# Patient Record
Sex: FEMALE | Race: WHITE | NOT HISPANIC OR LATINO | URBAN - METROPOLITAN AREA
[De-identification: names, ages, dates, MRNs, and addresses within clinical notes are randomized per-mention and may not be internally consistent; named-entity substitution may affect disease eponyms.]

---

## 2021-11-02 ENCOUNTER — EMERGENCY (EMERGENCY)
Facility: HOSPITAL | Age: 26
LOS: 1 days | Discharge: HOME | End: 2021-11-02
Admitting: EMERGENCY MEDICINE
Payer: COMMERCIAL

## 2021-11-02 VITALS
WEIGHT: 240.08 LBS | DIASTOLIC BLOOD PRESSURE: 90 MMHG | SYSTOLIC BLOOD PRESSURE: 125 MMHG | HEIGHT: 64 IN | RESPIRATION RATE: 18 BRPM | TEMPERATURE: 98 F | OXYGEN SATURATION: 98 % | HEART RATE: 76 BPM
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
## 2021-11-02 DIAGNOSIS — S93.401A SPRAIN OF UNSPECIFIED LIGAMENT OF RIGHT ANKLE, INITIAL ENCOUNTER: ICD-10-CM

## 2021-11-02 DIAGNOSIS — M25.571 PAIN IN RIGHT ANKLE AND JOINTS OF RIGHT FOOT: ICD-10-CM

## 2021-11-02 PROCEDURE — 99283 EMERGENCY DEPT VISIT LOW MDM: CPT

## 2021-11-02 PROCEDURE — 73630 X-RAY EXAM OF FOOT: CPT | Mod: 26,RT

## 2021-11-02 PROCEDURE — 73610 X-RAY EXAM OF ANKLE: CPT

## 2021-11-02 PROCEDURE — 73610 X-RAY EXAM OF ANKLE: CPT | Mod: 26,RT

## 2021-11-02 PROCEDURE — 73630 X-RAY EXAM OF FOOT: CPT

## 2021-11-02 PROCEDURE — 99284 EMERGENCY DEPT VISIT MOD MDM: CPT | Mod: 25

## 2021-11-02 RX ORDER — IBUPROFEN 200 MG
600 TABLET ORAL ONCE
Refills: 0 | Status: COMPLETED | OUTPATIENT
Start: 2021-11-02 | End: 2021-11-02

## 2021-11-02 RX ADMIN — Medication 600 MILLIGRAM(S): at 15:17

## 2021-11-02 RX ADMIN — Medication 600 MILLIGRAM(S): at 15:24

## 2021-11-02 NOTE — ED PROVIDER NOTE - PHYSICAL EXAMINATION
VITAL SIGNS: I have reviewed nursing notes and confirm.  CONSTITUTIONAL: Well-developed; well-nourished; in no acute distress.   SKIN:  warm and dry, no acute rash.   HEAD:  normocephalic, atraumatic.  EYES: PERRL, EOM intact; conjunctiva and sclera clear.  ENT: No nasal discharge; airway clear.   NECK: Supple; non tender.  CARD: S1, S2 normal; no murmurs, gallops, or rubs. Regular rate and rhythm.   RESP:  Clear to auscultation b/l, no wheezes, rales or rhonchi.  ABD: Normal bowel sounds; soft; non-distended; non-tender; no guarding/ rebound.  EXT: Normal ROM. No clubbing, cyanosis or edema. 2+ pulses to b/l ue/le.  NEURO: Alert, oriented, grossly unremarkable  PSYCH: Cooperative, mood and affect appropriate.    RLE: VITAL SIGNS: I have reviewed nursing notes and confirm.  CONSTITUTIONAL: Well-developed; well-nourished; in no acute distress.   SKIN:  warm and dry, no acute rash.   HEAD:  normocephalic, atraumatic.  EYES: PERRL, EOM intact; conjunctiva and sclera clear.  ENT: No nasal discharge; airway clear.   NECK: Supple; non tender.  CARD: S1, S2 normal; no murmurs, gallops, or rubs. Regular rate and rhythm.   RESP:  Clear to auscultation b/l, no wheezes, rales or rhonchi.  ABD: Normal bowel sounds; soft; non-distended; non-tender; no guarding/ rebound.  EXT: Normal ROM. No clubbing, cyanosis or edema. 2+ pulses to b/l ue/le.  NEURO: Alert, oriented, grossly unremarkable  PSYCH: Cooperative, mood and affect appropriate.    RLE: No knee tenderness or swelling. She has swelling over the lateral malleolus with tenderness to palpation. No medial malleolar tenderness. No mid-foot tenderness. Non-tender to palpation of proximal fifth metatarsal. She has limited ROM of ankle due to pain, FROM of toes. Distal sensation intact. 2+ DP pulse.

## 2021-11-02 NOTE — ED PROVIDER NOTE - OBJECTIVE STATEMENT
26 year old female with no PMHx presents with right ankle pain s/p trip and fall on a pot hole one hour ago. Pt states that she twisted her ankle while she fell down after walking into a pot hole by accident. Pt reports that she has been unable to bear weight on the ankle. Pt has not taken any pain medications at this time.    Denies right shin or calf pain, head trauma, numbness and tingling or pain/trauma to any other part of the body.

## 2021-11-02 NOTE — ED PROVIDER NOTE - NSFOLLOWUPINSTRUCTIONS_ED_ALL_ED_FT
Your x-ray did not show any broken bones. An ankle sprain can also be very painful.    Take ibuprofen 600mg up to three times for pain. Elevate your ankle and apply ice pack for 20 minutes every 2-3 hours to reduce swelling. Wear air cast and use crutches. You can begin bearing weight as tolerated.    Follow up with your primary care doctor if you have persistent symptoms. You may take several weeks to fully heal.      Ankle Sprain    WHAT YOU NEED TO KNOW:    What is an ankle sprain? An ankle sprain happens when 1 or more ligaments in your ankle joint stretch or tear. Ligaments are tough tissues that connect bones. Ligaments support your joints and keep your bones in place.    What are the signs and symptoms of an ankle sprain?   •Trouble moving your ankle or foot      •Pain when you touch or put weight on your ankle      •Bruised, swollen, or misshapen ankle      How is an ankle sprain diagnosed? Your healthcare provider will ask you about your injury and examine you. Tell him or her if you heard a snap or pop when you were injured. Your healthcare provider will check the movement and strength of your joint. You may be asked to move the joint yourself. Tell a healthcare provider if you have ever had an allergic reaction to contrast liquid. You may need any of the following:   •A joint x-ray is a picture of the bones and tissues in your joints. You may be given contrast liquid as a shot into your joint before the x-ray. This contrast liquid will help your joint show up better on the x-ray. A joint x-ray with contrast liquid is called an arthrogram.      •An MRI may show the sprain. You may be given contrast liquid to help the pictures show up better. Do not enter the MRI room with anything metal. Metal can cause serious injury. Tell a healthcare provider if you have any metal in or on your body.      How is an ankle sprain treated?   •Support devices, such as a brace, cast, or splint, may be needed to limit your movement and protect your joint. You may need to use crutches to decrease your pain as you move around.      •Medicines: ?NSAIDs, such as ibuprofen, help decrease swelling, pain, and fever. This medicine is available with or without a doctor's order. NSAIDs can cause stomach bleeding or kidney problems in certain people. If you take blood thinner medicine, always ask your healthcare provider if NSAIDs are safe for you. Always read the medicine label and follow directions.      ?Acetaminophen decreases pain and fever. It is available without a doctor's order. Ask how much to take and how often to take it. Follow directions. Read the labels of all other medicines you are using to see if they also contain acetaminophen, or ask your doctor or pharmacist. Acetaminophen can cause liver damage if not taken correctly. Do not use more than 4 grams (4,000 milligrams) total of acetaminophen in one day.       ?Prescription pain medicine may be given. Ask your healthcare provider how to take this medicine safely. Some prescription pain medicines contain acetaminophen. Do not take other medicines that contain acetaminophen without talking to your healthcare provider. Too much acetaminophen may cause liver damage. Prescription pain medicine may cause constipation. Ask your healthcare provider how to prevent or treat constipation.       •Physical therapy may be recommended. A physical therapist teaches you exercises to help improve movement and strength, and to decrease pain.      •Surgery may be needed to repair or replace a torn ligament if your sprain does not heal with other treatments. Your healthcare provider may use screws to attach the bones in your ankle together. The screws may help support your ankle and make it stable. Ask your healthcare provider for more information about surgery to treat your ankle sprain.      How can I manage my ankle sprain?   •Rest your ankle so that it can heal. Return to normal activities as directed.      •Apply ice on your ankle for 15 to 20 minutes every hour or as directed. Use an ice pack, or put crushed ice in a plastic bag. Cover it with a towel. Ice helps prevent tissue damage and decreases swelling and pain.      •Compress your ankle. Ask if you should wrap an elastic bandage around your injured ligament. An elastic bandage provides support and helps decrease swelling and movement so your joint can heal. Wear as long as directed.  How to Wrap an Elastic Bandage           •Elevate your ankle above the level of your heart as often as you can. This will help decrease swelling and pain. Prop your ankle on pillows or blankets to keep it elevated comfortably.   Elevate Leg           How can I prevent another ankle sprain?   •Let your ankle heal. Find out how long your ligament needs to heal. Do not do any physical activity until your healthcare provider says it is okay. If you start activity too soon, you may develop a more serious injury.      •Always warm up and stretch before you exercise or play sports.      •Use the right equipment. Always wear shoes that fit well and are made for the activity that you are doing. You may also need ankle supports, elbow and knee pads, or braces.      When should I seek immediate care?   •You have severe pain in your ankle.      •Your foot or toes are cold or numb.      •Your ankle becomes more weak or unstable (wobbly).      •You are unable to put any weight on your ankle or foot.      •Your swelling has increased or returned.      When should I call my doctor?   •Your pain does not go away, even after treatment.      •You have questions or concerns about your condition or care.      CARE AGREEMENT:    You have the right to help plan your care. Learn about your health condition and how it may be treated. Discuss treatment options with your healthcare providers to decide what care you want to receive. You always have the right to refuse treatment.

## 2021-11-02 NOTE — ED PROVIDER NOTE - CLINICAL SUMMARY MEDICAL DECISION MAKING FREE TEXT BOX
26 year old female with no PMHx presents with right ankle pain s/p trip and fall on a pot hole one hour ago. On physical exam there is moderate swelling and tenderness over the lateral aspect of the ankle. Right foot/ankle xray done -- 26 year old female with no PMHx presents with right ankle pain s/p trip and fall on a pot hole one hour ago. On physical exam there is moderate swelling and tenderness over the lateral aspect of the ankle. Right foot/ankle xray done without acute bony injury. Discussed all results with pt and her mother. placed in air cast and provided with crutches. Counseled on supportive care. Return precautions given.

## 2021-11-02 NOTE — ED PROVIDER NOTE - PATIENT PORTAL LINK FT
You can access the FollowMyHealth Patient Portal offered by [facility] by registering at the following website: [email or web address]. By joining [employer]’s FollowMyHealth portal, you will also be able to view your health information using other applications (apps) compatible with our system.